# Patient Record
(demographics unavailable — no encounter records)

---

## 2024-10-09 NOTE — REVIEW OF SYSTEMS
[Feeling Fatigued] : feeling fatigued [Negative] : Musculoskeletal [FreeTextEntry2] : see HPI [FreeTextEntry5] : see HPI [FreeTextEntry7] : see HPI [de-identified] : see HPI [de-identified] : see HPI

## 2024-10-09 NOTE — ADDENDUM
[FreeTextEntry1] : I reviewed the 9/30/2024, ECG from her PCP office. It revealed Sinus Bradycardia, normal ECG. I also reviewed lipid panel and her LDL is elevated. Given the arterial breast calcifications, I would advise starting atorvastatin 20mg nightly.  Patient may proceed from a cardiology standpoint for planned hernia surgery.

## 2024-10-09 NOTE — PHYSICAL EXAM
[Normal] : no edema, no cyanosis, no clubbing, no varicosities [Moves all extremities] : moves all extremities [No Focal Deficits] : no focal deficits [de-identified] : No carotid bruits auscultated bilaterally

## 2024-10-09 NOTE — CARDIOLOGY SUMMARY
[de-identified] : 5/8/2024, NSR with non-specific ST changes 11/8/2023, NSR, poor R wave progression V1-V3 9/21/2022, NSR, poor R wave progression V1-V3 [de-identified] : 10/6/2022, LV EF 5-60%, trace MR, mild TR, estimated PASP of 19mmHg.

## 2024-10-09 NOTE — HISTORY OF PRESENT ILLNESS
[FreeTextEntry1] : Historical Perspective: 45 year old female, former EtOH abuse, s/p fall down stairs in 2020 causing subdural hematoma, traumatic brain injury, aphasia (improved), has depression/anxiety, HTN, GERD, currently homeless and living with friend, presents for a cardiac evaluation because of uncontrolled BPs. Patient has no chest pain, dyspnea or palpitations. She admits to compliance with her medical therapy. Patient states that over the past few months her BP has been uncontrolled. Previously on losartan 100mg daily and metoprolol 50mg BID. Last week had her losartan discontinued and prescribed lisinopril 20mg daily.  There is no history of MI, CVA, CHF, or previous coronary intervention.  Current Health Status: Patient had mammogram done, 9/2024 and it demonstrated arterial calcifications. She is also going to have hernia surgery done. No date set yet. She had an ECG done the other day with PCP. She has no chest pain or pressure. No abnormal dyspnea.

## 2024-10-09 NOTE — REVIEW OF SYSTEMS
[Feeling Fatigued] : feeling fatigued [Negative] : Musculoskeletal [FreeTextEntry2] : see HPI [FreeTextEntry5] : see HPI [FreeTextEntry7] : see HPI [de-identified] : see HPI [de-identified] : see HPI

## 2024-10-09 NOTE — DISCUSSION/SUMMARY
[FreeTextEntry1] : 1. HTN: controlled. Continuing Losartan//25mg daily. Goal BP is less than 130/80. Recommend low salt diet.   2. Abnormal ECG: no evidence of structural heart disease on echocardiogram from 10/2022.  Will attempt to obtain most recent labs and ECG from PCP. Once reviewed, I can make further recommendations regarding perioperative care of this patient as well as further recommendations regarding statin therapy.  Follow up in 6 months.

## 2024-10-09 NOTE — CARDIOLOGY SUMMARY
[de-identified] : 5/8/2024, NSR with non-specific ST changes 11/8/2023, NSR, poor R wave progression V1-V3 9/21/2022, NSR, poor R wave progression V1-V3 [de-identified] : 10/6/2022, LV EF 5-60%, trace MR, mild TR, estimated PASP of 19mmHg.

## 2024-10-09 NOTE — PHYSICAL EXAM
[Normal] : no edema, no cyanosis, no clubbing, no varicosities [Moves all extremities] : moves all extremities [No Focal Deficits] : no focal deficits [de-identified] : No carotid bruits auscultated bilaterally

## 2025-04-02 NOTE — REVIEW OF SYSTEMS
[FreeTextEntry5] : see HPI [FreeTextEntry7] : see HPI [de-identified] : see HPI [de-identified] : see HPI

## 2025-04-02 NOTE — CARDIOLOGY SUMMARY
[de-identified] : 4/2/2025, NSR 5/8/2024, NSR with non-specific ST changes 11/8/2023, NSR, poor R wave progression V1-V3 9/21/2022, NSR, poor R wave progression V1-V3 [de-identified] : 10/6/2022, LV EF 5-60%, trace MR, mild TR, estimated PASP of 19mmHg.

## 2025-04-02 NOTE — HISTORY OF PRESENT ILLNESS
[FreeTextEntry1] : Historical Perspective: 46 year old female, former EtOH abuse, s/p fall down stairs in 2020 causing subdural hematoma, traumatic brain injury, aphasia (improved), has depression/anxiety, HTN, GERD, currently homeless and living with friend, presents for a cardiac evaluation because of uncontrolled BPs. Patient has no chest pain, dyspnea or palpitations. She admits to compliance with her medical therapy. Patient states that over the past few months her BP has been uncontrolled. Previously on losartan 100mg daily and metoprolol 50mg BID. Last week had her losartan discontinued and prescribed lisinopril 20mg daily.  There is no history of MI, CVA, CHF, or previous coronary intervention.  Current Health Status: Patient had mammogram done, 9/2024 and it demonstrated arterial calcifications. She is also pending hernia surgery done. No date set yet. She has no chest pain, dyspnea or palpitations.

## 2025-04-02 NOTE — DISCUSSION/SUMMARY
[FreeTextEntry1] : 1. HTN: controlled. Continuing Losartan//25mg daily. Goal BP is less than 130/80. Recommend low salt diet.   2. HLD: reviewed labs with patient from 4/1/2025. LDL in the 90s. Given vascular calcifications on mammogram, advising LDL to be less than 55. Recommend increasing atorvastatin to 40mg nightly.  The patient has greater than 4 METs activity level without exertional complaints. There are no acute ECG changes, no murmur of aortic stenosis, and no clinical signs of heart failure. Patient is optimized from a cardiology standpoint to undergo the planned surgical procedure.  Follow up in 6 months. [EKG obtained to assist in diagnosis and management of assessed problem(s)] : EKG obtained to assist in diagnosis and management of assessed problem(s)

## 2025-04-02 NOTE — PHYSICAL EXAM
[Normal] : no edema, no cyanosis, no clubbing, no varicosities [Moves all extremities] : moves all extremities [No Focal Deficits] : no focal deficits [de-identified] : No carotid bruits auscultated bilaterally Depth Of Biopsy: dermis

## 2025-07-23 NOTE — PHYSICAL EXAM
[JVD] : no jugular venous distention  [No Rash or Lesion] : No rash or lesion [Alert] : alert [Oriented to Person] : oriented to person [Oriented to Place] : oriented to place [Oriented to Time] : oriented to time [Calm] : calm [de-identified] : No acute distress [de-identified] : No respiratory distress [de-identified] : Regular rate [de-identified] : soft, nontender. no rebound or guarding. palpable umbilical hernia defect, partially reducible, nontender [de-identified] : normal range of motion

## 2025-07-23 NOTE — HISTORY OF PRESENT ILLNESS
[de-identified] : Ms. CELIS is a 47 year old woman with an incisional periumbilical hernia, who returns today for followup wishing to proceed with hernia repair. She again reports pain periumbilically, radiating to right mid abdomen. Denies fever/chills/nausea/emesis or changes in bowel or bladder habits. Tolerating diet. Normal bowel movements.   reports smoking - Approximately 5 minutes of smoking cessation counseling provided. She is motivated to quit however wishes to proceed with hernia repair prior to smoking cessation. We discussed that she is at elevated risk of perioperative perioperative morbidity and mortality and increased risk of hernia recurrence. She understands and agrees. We will plan for hernia repair with phasix mesh to decrease risk of surgical site / mesh infection however patient understands that this is a significant risk.   CTAP reviewed - fat containing umbilical hernia

## 2025-07-23 NOTE — ASSESSMENT
[FreeTextEntry1] : Ms. CELIS is a 47 year old woman with umbilical hernia. We discussed the options of robotic/laparoscopic repair, open repair, and nonoperative management. The risks/benefits and alternatives were discussed at length and all questions were answered. We discussed the risks and benefits of the use of mesh. Approximately 5 minutes of smoking cessation counseling provided. She is motivated to quit however wishes to proceed with hernia repair prior to smoking cessation. We discussed that she is at elevated risk of perioperative perioperative morbidity and mortality and increased risk of hernia recurrence. She understands and agrees. We will plan for hernia repair with phasix mesh to decrease risk of surgical site / mesh infection however patient understands that this is a significant risk. The patient appears to understand and wishes to proceed with robotic umbilical hernia repair with phasix mesh.